# Patient Record
Sex: MALE | Race: WHITE | Employment: STUDENT | URBAN - METROPOLITAN AREA
[De-identification: names, ages, dates, MRNs, and addresses within clinical notes are randomized per-mention and may not be internally consistent; named-entity substitution may affect disease eponyms.]

---
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
## 2025-05-29 ENCOUNTER — OFFICE VISIT (OUTPATIENT)
Dept: FAMILY MEDICINE CLINIC | Facility: CLINIC | Age: 20
End: 2025-05-29
Payer: COMMERCIAL

## 2025-05-29 VITALS
TEMPERATURE: 98 F | BODY MASS INDEX: 25.9 KG/M2 | SYSTOLIC BLOOD PRESSURE: 114 MMHG | WEIGHT: 185 LBS | OXYGEN SATURATION: 99 % | HEART RATE: 65 BPM | HEIGHT: 71 IN | DIASTOLIC BLOOD PRESSURE: 60 MMHG | RESPIRATION RATE: 18 BRPM

## 2025-05-29 DIAGNOSIS — J06.9 UPPER RESPIRATORY TRACT INFECTION, UNSPECIFIED TYPE: Primary | ICD-10-CM

## 2025-05-29 DIAGNOSIS — H10.023 OTHER MUCOPURULENT CONJUNCTIVITIS OF BOTH EYES: ICD-10-CM

## 2025-05-29 PROCEDURE — 99213 OFFICE O/P EST LOW 20 MIN: CPT | Performed by: NURSE PRACTITIONER

## 2025-05-29 PROCEDURE — 3074F SYST BP LT 130 MM HG: CPT | Performed by: NURSE PRACTITIONER

## 2025-05-29 PROCEDURE — 3008F BODY MASS INDEX DOCD: CPT | Performed by: NURSE PRACTITIONER

## 2025-05-29 PROCEDURE — 3078F DIAST BP <80 MM HG: CPT | Performed by: NURSE PRACTITIONER

## 2025-05-29 NOTE — PROGRESS NOTES
HPI:   Bert Lemons is a 19 year old male who presents with ill symptoms for  4  days. Patient reports sore throat, some improvement then worsening symptoms of sore throat again, congestion, sinus pressure, now with bilateral eye redness/drainage left worse than right. Has tried Nyquil last night with not much relief. No known contacts sick.    No current outpatient medications on file.     No current facility-administered medications for this visit.      Past Medical History[1]   Past Surgical History[2]   Family History[3]   Short Social Hx on File[4]      REVIEW OF SYSTEMS:   GENERAL: feels well otherwise  HEENT: congested, as above in HPI  LUNGS: denies shortness of breath with exertion  CARDIOVASCULAR: denies chest pain on exertion  GI: no nausea or abdominal pain, appetite down  NEURO: admits to mild headaches    EXAM:   /60   Pulse 65   Temp 97.8 °F (36.6 °C)   Resp 18   Ht 5' 11\" (1.803 m)   Wt 185 lb (83.9 kg)   SpO2 99%   BMI 25.80 kg/m²   GENERAL: well developed, well nourished,in no apparent distress, bilateral eyes sclera reddened with yellow/clear drainage from right eye noted  HEENT: atraumatic, normocephalic,ears clear, nares with mild congestion, throat pink. [place]la midline.  Mild maxillary sinus tenderness with palpation.  NECK: supple,anterior cervical adenopathy  LUNGS: clear to auscultation  CARDIO: RRR without murmur      ASSESSMENT AND PLAN:    PLAN:Bert was seen today for sore throat.    Diagnoses and all orders for this visit:    Upper respiratory tract infection, unspecified type  -     amoxicillin clavulanate 875-125 MG Oral Tab; Take 1 tablet by mouth 2 (two) times daily for 7 days.    Other mucopurulent conjunctivitis of both eyes      Script to hold for worsening symptoms. Self care discussed. Medication use and risk/benefit discussed. Patient is advised to follow up with PCP if not improving with treatment plan or seek immediate care if symptoms worsen.  The patient  indicates understanding of these issues and agrees to the plan.  Patient Instructions   Start lubricating drops such as Systane or Refresh as discussed. Use a warm wet cloth to remove crust, use a cold pack to help with itching.  Change out pillowcase tonight and tomorrow.  If symptoms worsen despite below  you may start Augmentin. Take with food, eat yogurt daily or take probiotics to prevent upset stomach/diarrhea.  Saline nasal spray such as Ocean or Simply Saline to nostrils 2-3 times daily to soothe tissues and keep mucus thin.  Salt water gargles (1 tsp. Salt in 6 oz lukewarm water), use several times daily to remove post nasal drainage and soothe throat.  Hydrate! (cold or hot based on comfort). Drink lots of water or other non dehydrating liquids to help with illness.   Use humidifier in bedroom for congestion. Hot steamy showers can loosen congestion and help cough. Raghu's vapor rub to chest can quiet cough. Keep water by your bed side to sip on if you awaken with cough/sore throat.  Warm pack to face in shower to help with facial pressure/sinus drainage.  Hand washing-use hand  or wash hands frequently, cover your cough or sneeze, do not share towels or drinks with others.  May use Tylenol or Ibuprofen for pain/comfort if needed.  No work or school until fever free for 24 hours and respiratory symptoms are mostly improved.  Follow up with primary care in two weeks if symptoms not completely resolved post walk in visit, or seek immediate care if symptoms significantly worsen.                 [1] No past medical history on file.  [2] No past surgical history on file.  [3]   Family History  Problem Relation Age of Onset    Other (Other) Maternal Grandmother     Cancer Maternal Grandfather     Heart Disorder Maternal Grandfather     Other (Other) Maternal Grandfather     Other (Other) Paternal Grandmother    [4]   Social History  Socioeconomic History    Marital status: Single   Tobacco Use    Smoking  status: Never    Smokeless tobacco: Never   Vaping Use    Vaping status: Never Used   Substance and Sexual Activity    Alcohol use: Never    Drug use: Never

## 2025-05-29 NOTE — PATIENT INSTRUCTIONS
Start lubricating drops such as Systane or Refresh as discussed. Use a warm wet cloth to remove crust, use a cold pack to help with itching.  Change out pillowcase tonight and tomorrow.  If symptoms worsen despite below  you may start Augmentin. Take with food, eat yogurt daily or take probiotics to prevent upset stomach/diarrhea.  Saline nasal spray such as Ocean or Simply Saline to nostrils 2-3 times daily to soothe tissues and keep mucus thin.  Salt water gargles (1 tsp. Salt in 6 oz lukewarm water), use several times daily to remove post nasal drainage and soothe throat.  Hydrate! (cold or hot based on comfort). Drink lots of water or other non dehydrating liquids to help with illness.   Use humidifier in bedroom for congestion. Hot steamy showers can loosen congestion and help cough. Raghu's vapor rub to chest can quiet cough. Keep water by your bed side to sip on if you awaken with cough/sore throat.  Warm pack to face in shower to help with facial pressure/sinus drainage.  Hand washing-use hand  or wash hands frequently, cover your cough or sneeze, do not share towels or drinks with others.  May use Tylenol or Ibuprofen for pain/comfort if needed.  No work or school until fever free for 24 hours and respiratory symptoms are mostly improved.  Follow up with primary care in two weeks if symptoms not completely resolved post walk in visit, or seek immediate care if symptoms significantly worsen.